# Patient Record
Sex: FEMALE | Race: WHITE | NOT HISPANIC OR LATINO
[De-identification: names, ages, dates, MRNs, and addresses within clinical notes are randomized per-mention and may not be internally consistent; named-entity substitution may affect disease eponyms.]

---

## 2017-01-26 ENCOUNTER — HOSPITAL (OUTPATIENT)
Dept: OTHER | Age: 82
End: 2017-01-26
Attending: INTERNAL MEDICINE

## 2017-01-26 LAB
A/G RATIO_: 0.8
ABS LYMPH: 1 K/CUMM (ref 1–3.5)
ABS MONO: 0.6 K/CUMM (ref 0.1–0.8)
ABS NEUTRO: 7 K/CUMM (ref 2–8)
ALBUMIN: 3.3 G/DL (ref 3.5–5)
ALK PHOS: 97 UNIT/L (ref 50–124)
ALT/GPT: 18 UNIT/L (ref 0–55)
ANION GAP SERPL CALC-SCNC: 15 MEQ/L (ref 10–20)
AST/GOT: 26 UNIT/L (ref 5–34)
BASOPHIL: 1 % (ref 0–1)
BILI TOTAL: 0.6 MG/DL (ref 0.2–1)
BUN SERPL-MCNC: 36 MG/DL (ref 6–20)
CALCIUM: 10 MG/DL (ref 8.4–10.2)
CHLORIDE: 105 MEQ/L (ref 97–107)
CREATININE: 1.88 MG/DL (ref 0.6–1.3)
DIFF_TYPE?: ABNORMAL
EOSINOPHIL: 6 % (ref 0–6)
GLOBULIN_: 4 G/DL (ref 2–4.1)
GLUCOSE LVL: 106 MG/DL (ref 70–99)
HCT VFR BLD CALC: 32 % (ref 33–45)
HEMOLYSIS 2+: NEGATIVE
HGB BLD-MCNC: 10.8 G/DL (ref 11–15)
IMMATURE GRAN: 0.3 % (ref 0–0.3)
INSTR WBC: 9.3 K/CUMM (ref 4–11)
LIPEMIC 3+: NEGATIVE
LYMPHOCYTE: 11 %
MCH RBC QN AUTO: 32 PG (ref 25–35)
MCHC RBC AUTO-ENTMCNC: 33 G/DL (ref 32–37)
MCV RBC AUTO: 96 FL (ref 78–97)
MONOCYTE: 7 %
NEUTROPHIL: 76 %
NRBC BLD MANUAL-RTO: 0 % (ref 0–0.2)
PLATELET: 275 K/CUMM (ref 150–450)
POTASSIUM: 4.6 MEQ/L (ref 3.5–5.1)
RBC # BLD: 3.38 M/CUMM (ref 3.7–5.2)
RDW: 13.2 % (ref 11.5–14.5)
SODIUM: 139 MEQ/L (ref 136–145)
TCO2: 24 MEQ/L (ref 19–29)
TOTAL PROTEIN: 7.3 G/DL (ref 6.4–8.3)
WBC # BLD: 9.3 K/CUMM (ref 4–11)

## 2017-01-27 LAB
ABS LYMPH MAN: 1.4 K/CUMM (ref 1–3.5)
ABS MONO MAN: 0.4 K/CUMM (ref 0.1–0.8)
ABS NEUT MAN: 5 K/CUMM (ref 1.8–7.8)
ANION GAP SERPL CALC-SCNC: 13 MEQ/L (ref 10–20)
BASOPHIL MAN: 1 % (ref 0–1)
BUN SERPL-MCNC: 32 MG/DL (ref 6–20)
CALCIUM: 9.1 MG/DL (ref 8.4–10.2)
CHLORIDE: 105 MEQ/L (ref 97–107)
CREATININE: 1.82 MG/DL (ref 0.6–1.3)
DIFF_TYPE?: ABNORMAL
EOS MAN: 3 % (ref 0–4)
GLUCOSE LVL: 102 MG/DL (ref 70–99)
HCT VFR BLD CALC: 32 % (ref 33–45)
HEMOLYSIS 2+: NEGATIVE
HEMOLYSIS 2+: NEGATIVE
HGB BLD-MCNC: 10.7 G/DL (ref 11–15)
INSTR WBC: 7 K/CUMM (ref 4–11)
LYMPH MAN: 16 %
MAGNESIUM LEVEL: 2 MG/DL (ref 1.6–2.6)
MCH RBC QN AUTO: 32 PG (ref 25–35)
MCHC RBC AUTO-ENTMCNC: 33 G/DL (ref 32–37)
MCV RBC AUTO: 97 FL (ref 78–97)
MONOCYTE MAN: 5 %
NRBC BLD MANUAL-RTO: 0 % (ref 0–0.2)
PLATELET: 242 K/CUMM (ref 150–450)
PLT ESTIMATE: ADEQUATE
POTASSIUM: 5.2 MEQ/L (ref 3.5–5.1)
RBC # BLD: 3.31 M/CUMM (ref 3.7–5.2)
RBC MORPH: ABNORMAL
RDW: 13.2 % (ref 11.5–14.5)
REACT LYMPH MAN: 4 %
SEGS MAN: 71 %
SODIUM: 139 MEQ/L (ref 136–145)
TCO2: 26 MEQ/L (ref 19–29)
TOTAL LYMPHS MANUAL: 20 %
UA APPEAR: CLEAR
UA BILI: NEGATIVE
UA BLOOD: NEGATIVE
UA COLOR: YELLOW
UA GLUCOSE: NEGATIVE
UA KETONES: NEGATIVE
UA LEUK EST: NEGATIVE
UA NITRITE: NEGATIVE
UA PH: 6 (ref 5–7)
UA PROTEIN: NEGATIVE
UA SPEC GRAV: 1.01 (ref 1.01–1.02)
UA UROBILINOGEN: 0.2 MG/DL (ref 0.2–1)
WBC # BLD: 7 K/CUMM (ref 4–11)

## 2017-01-28 LAB
ANION GAP SERPL CALC-SCNC: 14 MEQ/L (ref 10–20)
BUN SERPL-MCNC: 26 MG/DL (ref 6–20)
CALCIUM: 9.3 MG/DL (ref 8.4–10.2)
CHLORIDE: 104 MEQ/L (ref 97–107)
CREATININE: 1.55 MG/DL (ref 0.6–1.3)
GLUCOSE LVL: 100 MG/DL (ref 70–99)
HEMOLYSIS 2+: NEGATIVE
POTASSIUM: 4.5 MEQ/L (ref 3.5–5.1)
SODIUM: 138 MEQ/L (ref 136–145)
TCO2: 25 MEQ/L (ref 19–29)

## 2017-01-29 LAB
ANION GAP SERPL CALC-SCNC: 13 MEQ/L (ref 10–20)
BUN SERPL-MCNC: 34 MG/DL (ref 6–20)
CALCIUM: 9.2 MG/DL (ref 8.4–10.2)
CHLORIDE: 106 MEQ/L (ref 97–107)
CREATININE: 1.63 MG/DL (ref 0.6–1.3)
GLUCOSE LVL: 94 MG/DL (ref 70–99)
HEMOLYSIS 2+: NEGATIVE
POTASSIUM: 4.9 MEQ/L (ref 3.5–5.1)
SODIUM: 139 MEQ/L (ref 136–145)
TCO2: 25 MEQ/L (ref 19–29)

## 2017-01-30 LAB — SURG SPECIMEN: NORMAL

## 2017-02-01 ENCOUNTER — HOSPITAL (OUTPATIENT)
Dept: OTHER | Age: 82
End: 2017-02-01
Attending: INTERNAL MEDICINE

## 2017-02-03 ENCOUNTER — CHARTING TRANS (OUTPATIENT)
Dept: OTHER | Age: 82
End: 2017-02-03

## 2017-02-04 ENCOUNTER — HOSPITAL (OUTPATIENT)
Dept: OTHER | Age: 82
End: 2017-02-04

## 2017-02-04 LAB
ABS LYMPH: 2.4 K/CUMM (ref 1–3.5)
ABS MONO: 0.8 K/CUMM (ref 0.1–0.8)
ABS NEUTRO: 3.2 K/CUMM (ref 2–8)
ANION GAP SERPL CALC-SCNC: 13 MEQ/L (ref 10–20)
BASOPHIL: 1 % (ref 0–1)
BUN SERPL-MCNC: 36 MG/DL (ref 6–20)
CALCIUM: 9.2 MG/DL (ref 8.4–10.2)
CHLORIDE: 106 MEQ/L (ref 97–107)
CREATININE: 1.44 MG/DL (ref 0.6–1.3)
DIFF_TYPE?: ABNORMAL
EOSINOPHIL: 7 % (ref 0–6)
GLUCOSE LVL: 87 MG/DL (ref 70–99)
HCT VFR BLD CALC: 31 % (ref 33–45)
HEMOLYSIS 1+: NEGATIVE
HEMOLYSIS 2+: NEGATIVE
HGB BLD-MCNC: 10.1 G/DL (ref 11–15)
IMMATURE GRAN: 0.6 % (ref 0–0.3)
INSTR WBC: 7.1 K/CUMM (ref 4–11)
LYMPHOCYTE: 34 %
MCH RBC QN AUTO: 32 PG (ref 25–35)
MCHC RBC AUTO-ENTMCNC: 33 G/DL (ref 32–37)
MCV RBC AUTO: 99 FL (ref 78–97)
MONOCYTE: 12 %
NEUTROPHIL: 45 %
NRBC BLD MANUAL-RTO: 0 % (ref 0–0.2)
PLATELET: 313 K/CUMM (ref 150–450)
POTASSIUM: 5.1 MEQ/L (ref 3.5–5.1)
RBC # BLD: 3.13 M/CUMM (ref 3.7–5.2)
RDW: 13.7 % (ref 11.5–14.5)
SODIUM: 141 MEQ/L (ref 136–145)
TCO2: 27 MEQ/L (ref 19–29)
TSH SERPL-ACNC: 9.18 MIU/ML (ref 0.4–5)
VIT D 25 OH LVL: 36.6 NG/ML
WBC # BLD: 7.1 K/CUMM (ref 4–11)

## 2017-02-13 ENCOUNTER — HOSPITAL (OUTPATIENT)
Dept: OTHER | Age: 82
End: 2017-02-13
Attending: ANESTHESIOLOGY

## 2017-02-15 ENCOUNTER — HOSPITAL (OUTPATIENT)
Dept: OTHER | Age: 82
End: 2017-02-15
Attending: INTERNAL MEDICINE

## 2017-02-21 ENCOUNTER — HOSPITAL (OUTPATIENT)
Dept: OTHER | Age: 82
End: 2017-02-21
Attending: ANESTHESIOLOGY

## 2017-03-07 ENCOUNTER — HOSPITAL (OUTPATIENT)
Dept: OTHER | Age: 82
End: 2017-03-07
Attending: ANESTHESIOLOGY

## 2017-04-11 ENCOUNTER — HOSPITAL (OUTPATIENT)
Dept: OTHER | Age: 82
End: 2017-04-11
Attending: ANESTHESIOLOGY

## 2017-04-25 ENCOUNTER — HOSPITAL (OUTPATIENT)
Dept: OTHER | Age: 82
End: 2017-04-25
Attending: ANESTHESIOLOGY

## 2017-05-08 ENCOUNTER — HOSPITAL (OUTPATIENT)
Dept: OTHER | Age: 82
End: 2017-05-08
Attending: ANESTHESIOLOGY

## 2017-06-01 ENCOUNTER — APPOINTMENT (OUTPATIENT)
Dept: RADIATION ONCOLOGY | Facility: HOSPITAL | Age: 82
End: 2017-06-01
Attending: RADIOLOGY
Payer: MEDICARE

## 2017-06-06 ENCOUNTER — HOSPITAL (OUTPATIENT)
Dept: OTHER | Age: 82
End: 2017-06-06
Attending: INTERNAL MEDICINE

## 2017-06-06 LAB
DEVICE SN: NORMAL
POC_GLU: 99 MG/DL (ref 70–99)
TECH_ID: NORMAL

## 2017-07-01 ENCOUNTER — APPOINTMENT (OUTPATIENT)
Dept: RADIATION ONCOLOGY | Facility: HOSPITAL | Age: 82
End: 2017-07-01
Attending: RADIOLOGY
Payer: MEDICARE

## 2017-07-07 ENCOUNTER — OFFICE VISIT (OUTPATIENT)
Dept: RADIATION ONCOLOGY | Facility: HOSPITAL | Age: 82
End: 2017-07-07
Attending: RADIOLOGY
Payer: MEDICARE

## 2017-07-07 VITALS
SYSTOLIC BLOOD PRESSURE: 150 MMHG | BODY MASS INDEX: 25.89 KG/M2 | OXYGEN SATURATION: 99 % | DIASTOLIC BLOOD PRESSURE: 53 MMHG | WEIGHT: 155.38 LBS | RESPIRATION RATE: 20 BRPM | HEART RATE: 61 BPM | HEIGHT: 65 IN | TEMPERATURE: 98 F

## 2017-07-07 PROBLEM — C34.92 PRIMARY CANCER OF LEFT LUNG (HCC): Status: ACTIVE | Noted: 2017-07-07

## 2017-07-07 PROCEDURE — 99212 OFFICE O/P EST SF 10 MIN: CPT

## 2017-07-07 RX ORDER — PANTOPRAZOLE SODIUM 40 MG/1
40 TABLET, DELAYED RELEASE ORAL
COMMUNITY

## 2017-07-07 RX ORDER — FEXOFENADINE HCL 180 MG/1
180 TABLET ORAL DAILY
COMMUNITY

## 2017-07-07 RX ORDER — LOSARTAN POTASSIUM 50 MG/1
50 TABLET ORAL DAILY
COMMUNITY

## 2017-07-07 RX ORDER — FLUTICASONE PROPIONATE AND SALMETEROL 500; 50 UG/1; UG/1
1 POWDER RESPIRATORY (INHALATION) 2 TIMES DAILY
COMMUNITY

## 2017-07-07 RX ORDER — CHLORTHALIDONE 25 MG/1
25 TABLET ORAL DAILY
COMMUNITY

## 2017-07-07 RX ORDER — IPRATROPIUM BROMIDE AND ALBUTEROL SULFATE 2.5; .5 MG/3ML; MG/3ML
3 SOLUTION RESPIRATORY (INHALATION)
COMMUNITY

## 2017-07-07 RX ORDER — AMLODIPINE BESYLATE 5 MG/1
5 TABLET ORAL DAILY
COMMUNITY

## 2017-07-07 RX ORDER — BUPRENORPHINE HCL 8 MG/1
1 TABLET SUBLINGUAL DAILY
COMMUNITY

## 2017-07-07 RX ORDER — POTASSIUM CITRATE 10 MEQ/1
10 TABLET, EXTENDED RELEASE ORAL
COMMUNITY

## 2017-07-07 NOTE — PROGRESS NOTES
RADIATION ONCOLOGY NOTE    DATE OF VISIT: 7/7/2017    DIAGNOSIS :  History of  R lung Stage IA R lung cancer s/p Cyberknife in 7/2013, now with new L lung nodule with radiographic progression. Dear Greer Escudero and colleagues,    As you recall, Ms. Haley Burgess' Rfl:    fluticasone-salmeterol 500-50 MCG/DOSE Inhalation Aerosol Powder, Breath Activated Inhale 1 puff into the lungs 2 (two) times daily.  Disp:  Rfl:    ipratropium-albuterol 0.5-2.5 (3) MG/3ML Inhalation Solution Take 3 mL by nebulization 4 (four) francis nodule with radiographic progression. I have reviewed her outside images in detail with patient and daughter.   I have discussed various management options, including further serial observation, and role of attempted biopsy for definitive proof as the go

## 2017-07-07 NOTE — PROGRESS NOTES
Knowledge Deficit Plan Of Care:    Problem:  Knowledge Deficit    Problems related to:    Stereotactic Radiosurgery    Interventions:  Assess patient knowledge level  Assess knowledge needs  Instruct on treatment planning  Instruct on radiation therapy beto

## 2017-07-07 NOTE — PROGRESS NOTES
Respiratory Plan of Care (if applicable):  Respiratory Problem: Other: O2 dependent, energy conservation  Respiratory Problem Related to: Disease Process  Respiratory Interventions: Assess Respiratory Status, Assess Hydration Status, Monitor Vital Signs, M

## 2017-07-07 NOTE — PROGRESS NOTES
Safety Plan Of Care:    Safety Problem:  sob w/ activity, carries her O2    Related to:    80 yrs old, hx compression fx spine    General Safety Interventions:  80 yrs old, hx compression fx spine    Expected Outcomes:  No injury, trauma or fall  Knowledge

## 2017-07-12 PROCEDURE — 77334 RADIATION TREATMENT AID(S): CPT | Performed by: RADIOLOGY

## 2017-07-12 PROCEDURE — 77470 SPECIAL RADIATION TREATMENT: CPT | Performed by: RADIOLOGY

## 2017-07-12 PROCEDURE — 77290 THER RAD SIMULAJ FIELD CPLX: CPT | Performed by: RADIOLOGY

## 2017-07-13 PROCEDURE — 77370 RADIATION PHYSICS CONSULT: CPT | Performed by: RADIOLOGY

## 2017-07-13 PROCEDURE — 77293 RESPIRATOR MOTION MGMT SIMUL: CPT | Performed by: RADIOLOGY

## 2017-07-13 PROCEDURE — 77295 3-D RADIOTHERAPY PLAN: CPT | Performed by: RADIOLOGY

## 2017-07-18 ENCOUNTER — OFFICE VISIT (OUTPATIENT)
Dept: RADIATION ONCOLOGY | Facility: HOSPITAL | Age: 82
End: 2017-07-18
Attending: RADIOLOGY
Payer: MEDICARE

## 2017-07-18 VITALS
RESPIRATION RATE: 16 BRPM | HEART RATE: 65 BPM | BODY MASS INDEX: 25 KG/M2 | OXYGEN SATURATION: 100 % | TEMPERATURE: 98 F | WEIGHT: 152 LBS | SYSTOLIC BLOOD PRESSURE: 167 MMHG | DIASTOLIC BLOOD PRESSURE: 61 MMHG

## 2017-07-18 PROCEDURE — 77373 STRTCTC BDY RAD THER TX DLVR: CPT | Performed by: RADIOLOGY

## 2017-07-18 PROCEDURE — 77300 RADIATION THERAPY DOSE PLAN: CPT | Performed by: RADIOLOGY

## 2017-07-18 PROCEDURE — 77334 RADIATION TREATMENT AID(S): CPT | Performed by: RADIOLOGY

## 2017-07-18 PROCEDURE — 77280 THER RAD SIMULAJ FIELD SMPL: CPT | Performed by: RADIOLOGY

## 2017-07-18 NOTE — PROGRESS NOTES
[facility] Radiation Treatment Management Note 1-5    Patient:  Rj Casper  Age:  80year old  Visit Diagnosis:  No diagnosis found.   Primary Rad/Onc:  Dr. Cameron No Bruce    Site Delivered Dose (Gy) Prescribed Dose (Gy) Fraction #

## 2017-07-20 PROCEDURE — 77373 STRTCTC BDY RAD THER TX DLVR: CPT | Performed by: RADIOLOGY

## 2017-07-24 PROCEDURE — 77373 STRTCTC BDY RAD THER TX DLVR: CPT | Performed by: RADIOLOGY

## 2017-07-25 PROCEDURE — 77373 STRTCTC BDY RAD THER TX DLVR: CPT | Performed by: RADIOLOGY

## 2017-07-25 NOTE — PROGRESS NOTES
RADIATION ONCOLOGY COMPLETION SUMMARY NOTE    DIAGNOSIS :  History of  R lung Stage IA R lung cancer s/p Cyberknife in 7/2013, now with new L lung nodule with radiographic progression on serial scan, s/p definitive Cyberknife radiosurgery. Dear Greer Urban very much for allowing us to take care of your patient. Please do not hesitate to contact us with any questions.     [email or web address]  [phone]

## 2017-07-26 PROCEDURE — 77336 RADIATION PHYSICS CONSULT: CPT | Performed by: RADIOLOGY

## 2017-10-18 ENCOUNTER — HOSPITAL (OUTPATIENT)
Dept: OTHER | Age: 82
End: 2017-10-18
Attending: RADIOLOGY

## 2017-10-20 ENCOUNTER — OFFICE VISIT (OUTPATIENT)
Dept: RADIATION ONCOLOGY | Facility: HOSPITAL | Age: 82
End: 2017-10-20
Attending: RADIOLOGY
Payer: MEDICARE

## 2017-10-20 VITALS
WEIGHT: 154.19 LBS | SYSTOLIC BLOOD PRESSURE: 162 MMHG | BODY MASS INDEX: 25.69 KG/M2 | HEIGHT: 65 IN | RESPIRATION RATE: 20 BRPM | DIASTOLIC BLOOD PRESSURE: 69 MMHG

## 2017-10-20 PROCEDURE — 99211 OFF/OP EST MAY X REQ PHY/QHP: CPT

## 2017-10-20 NOTE — PROGRESS NOTES
Pt seen in follow up with Dr Gogo Camara, s/p CK lung. Pt and her daughter here to discuss recent CT scan results from 10/18/17. No imaging brought by pt. Pt follows closely with Dr Shen Aguilar, medical oncologist. Pt's qol is excellent per pt. O2 dependent.  Lives in

## 2017-10-21 NOTE — PROGRESS NOTES
RADIATION ONCOLOGY NOTE    DATE OF VISIT: 10/21/2017    DIAGNOSIS :  R lung Stage IA R lung cancer s/p Cyberknife in 7/2013 with tx response, more recently with L lung nodule with radiographic progression on serial scan, s/p definitive Cyberknife radiosurg tablet by mouth daily. Disp:  Rfl:    Lactobacillus (ULTIMATE PROBIOTIC FORMULA OR) Take 1 tablet by mouth daily.  Disp:  Rfl:    fluticasone-salmeterol 500-50 MCG/DOSE Inhalation Aerosol Powder, Breath Activated Inhale 1 puff into the lungs 2 (two) times d with tx response, more recently with L lung nodule with radiographic progression on serial scan, s/p definitive Cyberknife radiosurgery in 7/25/2017, doing well clinically and radiographically, for repeat CT scan in 3 months due to R lung nodule.       Pt d

## 2017-10-23 ENCOUNTER — DOCUMENTATION ONLY (OUTPATIENT)
Dept: RADIATION ONCOLOGY | Facility: HOSPITAL | Age: 82
End: 2017-10-23

## 2018-01-19 ENCOUNTER — HOSPITAL (OUTPATIENT)
Dept: OTHER | Age: 83
End: 2018-01-19
Attending: RADIOLOGY

## 2018-01-23 DIAGNOSIS — C34.90 LUNG CANCER (HCC): Primary | ICD-10-CM

## 2018-01-23 NOTE — PROGRESS NOTES
I spoke to the patient about recent ct scan results. Pt aware next ct scan is due in 3 months April 2018. Pt states she would like to continue to follow up with Dr. Muriel Encarnacion and her pcp. States she has transportation issues.   She will obtain ct orders

## 2018-03-18 ENCOUNTER — HOSPITAL (OUTPATIENT)
Dept: OTHER | Age: 83
End: 2018-03-18
Attending: HOSPITALIST

## 2018-03-18 LAB
A/G RATIO_: 1
ABS LYMPH: 0.2 K/CUMM (ref 1–3.5)
ABS MONO: 0.2 K/CUMM (ref 0.1–0.8)
ABS NEUTRO: 6.3 K/CUMM (ref 2–8)
ALBUMIN: 3.4 G/DL (ref 3.5–5)
ALK PHOS: 78 UNIT/L (ref 50–124)
ALT/GPT: 15 UNIT/L (ref 0–55)
ANION GAP SERPL CALC-SCNC: 12 MEQ/L (ref 10–20)
AST/GOT: 26 UNIT/L (ref 5–34)
BASOPHIL: 0 % (ref 0–1)
BILI TOTAL: 0.4 MG/DL (ref 0.2–1)
BUN SERPL-MCNC: 36 MG/DL (ref 6–20)
CALCIUM: 9.1 MG/DL (ref 8.4–10.2)
CHLORIDE: 106 MEQ/L (ref 97–107)
CREATININE: 1.81 MG/DL (ref 0.6–1.3)
DIFF_TYPE?: ABNORMAL
EOSINOPHIL: 0 % (ref 0–6)
GLOBULIN_: 3.5 G/DL (ref 2–4.1)
GLUCOSE LVL: 105 MG/DL (ref 70–99)
HCT VFR BLD CALC: 33 % (ref 33–45)
HEMOLYSIS 2+: NEGATIVE
HGB BLD-MCNC: 10.8 G/DL (ref 11–15)
ICTERIC 4+: NEGATIVE
IMMATURE GRAN: 0.1 % (ref 0–0.3)
INSTR WBC: 6.8 K/CUMM (ref 4–11)
LIPASE LEVEL: 99 UNIT/L (ref 8–78)
LIPEMIC 3+: NEGATIVE
LYMPHOCYTE: 4 %
MCH RBC QN AUTO: 33 PG (ref 25–35)
MCHC RBC AUTO-ENTMCNC: 33 G/DL (ref 32–37)
MCV RBC AUTO: 100 FL (ref 78–97)
MONOCYTE: 4 %
NEUTROPHIL: 92 %
NRBC BLD MANUAL-RTO: 0.3 % (ref 0–0.2)
PLATELET: 196 K/CUMM (ref 150–450)
PLT ESTIMATE: ADEQUATE
POTASSIUM: 4.1 MEQ/L (ref 3.5–5.1)
RBC # BLD: 3.29 M/CUMM (ref 3.7–5.2)
RBC MORPH: ABNORMAL
RDW: 13.2 % (ref 11.5–14.5)
SODIUM: 138 MEQ/L (ref 136–145)
TCO2: 24 MEQ/L (ref 19–29)
TOTAL PROTEIN: 6.9 G/DL (ref 6.4–8.3)
WBC # BLD: 6.8 K/CUMM (ref 4–11)

## 2018-03-20 LAB
ABS NEUTRO: 3.6 K/CUMM (ref 2–8)
ANION GAP SERPL CALC-SCNC: 11 MEQ/L (ref 10–20)
BUN SERPL-MCNC: 29 MG/DL (ref 6–20)
CALCIUM: 8.1 MG/DL (ref 8.4–10.2)
CHLORIDE: 109 MEQ/L (ref 97–107)
CREATININE: 1.41 MG/DL (ref 0.6–1.3)
GLUCOSE LVL: 82 MG/DL (ref 70–99)
HCT VFR BLD CALC: 31 % (ref 33–45)
HEMOLYSIS 2+: NEGATIVE
HGB BLD-MCNC: 9.9 G/DL (ref 11–15)
INSTR WBC: 6 K/CUMM (ref 4–11)
MCH RBC QN AUTO: 32 PG (ref 25–35)
MCHC RBC AUTO-ENTMCNC: 32 G/DL (ref 32–37)
MCV RBC AUTO: 102 FL (ref 78–97)
NRBC BLD MANUAL-RTO: 0 % (ref 0–0.2)
PLATELET: 178 K/CUMM (ref 150–450)
POTASSIUM: 4.6 MEQ/L (ref 3.5–5.1)
RBC # BLD: 3.08 M/CUMM (ref 3.7–5.2)
RDW: 13.4 % (ref 11.5–14.5)
SODIUM: 139 MEQ/L (ref 136–145)
TCO2: 24 MEQ/L (ref 19–29)
WBC # BLD: 6 K/CUMM (ref 4–11)

## 2018-03-21 ENCOUNTER — HOSPITAL (OUTPATIENT)
Dept: OTHER | Age: 83
End: 2018-03-21
Attending: FAMILY MEDICINE

## 2018-03-23 ENCOUNTER — CHARTING TRANS (OUTPATIENT)
Dept: OTHER | Age: 83
End: 2018-03-23

## 2018-03-26 ENCOUNTER — HOSPITAL (OUTPATIENT)
Dept: OTHER | Age: 83
End: 2018-03-26

## 2018-03-26 LAB
% SATURATION: 15 % (ref 20–55)
A/G RATIO_: 1
ABS LYMPH: 2.3 K/CUMM (ref 1–3.5)
ABS MONO: 0.6 K/CUMM (ref 0.1–0.8)
ABS NEUTRO: 3 K/CUMM (ref 2–8)
ALBUMIN: 2.9 G/DL (ref 3.5–5)
ALK PHOS: 67 UNIT/L (ref 50–124)
ALT/GPT: 15 UNIT/L (ref 0–55)
ANION GAP SERPL CALC-SCNC: 9 MEQ/L (ref 10–20)
AST/GOT: 20 UNIT/L (ref 5–34)
BASOPHIL: 1 % (ref 0–1)
BILI TOTAL: 0.3 MG/DL (ref 0.2–1)
BUN SERPL-MCNC: 25 MG/DL (ref 6–20)
CALC TIBC: 300 UG/DL (ref 260–445)
CALCIUM: 8.8 MG/DL (ref 8.4–10.2)
CHLORIDE: 106 MEQ/L (ref 97–107)
CREATININE: 1.17 MG/DL (ref 0.6–1.3)
DIFF_TYPE?: ABNORMAL
EOSINOPHIL: 2 % (ref 0–6)
FERRITIN: 68.3 NG/ML (ref 10–204)
FOLATE: 16 NG/ML (ref 7–31)
GLOBULIN_: 2.9 G/DL (ref 2–4.1)
GLUCOSE LVL: 87 MG/DL (ref 70–99)
HCT VFR BLD CALC: 29 % (ref 33–45)
HEMOLYSIS 1+: NEGATIVE
HEMOLYSIS 1+: NEGATIVE
HEMOLYSIS 2+: NEGATIVE
HEMOLYSIS 3+: NEGATIVE
HEMOLYSIS 3+: NEGATIVE
HGB BLD-MCNC: 9.5 G/DL (ref 11–15)
ICTERIC 4+: NEGATIVE
IMMATURE GRAN: 0.8 % (ref 0–0.3)
INSTR WBC: 6.1 K/CUMM (ref 4–11)
IRON LVL: 46 UG/DL (ref 35–135)
LIPASE LEVEL: 672 UNIT/L (ref 8–78)
LIPEMIC 3+: NEGATIVE
LIPEMIC 4+: NEGATIVE
LYMPHOCYTE: 38 %
MAGNESIUM LEVEL: 1.9 MG/DL (ref 1.6–2.6)
MCH RBC QN AUTO: 32 PG (ref 25–35)
MCHC RBC AUTO-ENTMCNC: 33 G/DL (ref 32–37)
MCV RBC AUTO: 99 FL (ref 78–97)
MONOCYTE: 10 %
NEUTROPHIL: 49 %
NRBC BLD MANUAL-RTO: 0 % (ref 0–0.2)
PHOSPHORUS: 3.4 MG/DL (ref 2.3–4.7)
PLATELET: 244 K/CUMM (ref 150–450)
POTASSIUM: 4 MEQ/L (ref 3.5–5.1)
RBC # BLD: 2.92 M/CUMM (ref 3.7–5.2)
RDW: 13.2 % (ref 11.5–14.5)
SODIUM: 142 MEQ/L (ref 136–145)
TCO2: 31 MEQ/L (ref 19–29)
TOTAL PROTEIN: 5.8 G/DL (ref 6.4–8.3)
TRANSFERRIN LEVEL: 214 MG/DL (ref 173–360)
VITAMIN B12 LVL: 639 PG/ML (ref 213–816)
WBC # BLD: 6.1 K/CUMM (ref 4–11)

## 2018-03-27 ENCOUNTER — HOSPITAL (OUTPATIENT)
Dept: OTHER | Age: 83
End: 2018-03-27

## 2018-03-28 ENCOUNTER — CHARTING TRANS (OUTPATIENT)
Dept: OTHER | Age: 83
End: 2018-03-28

## 2018-03-29 ENCOUNTER — HOSPITAL (OUTPATIENT)
Dept: OTHER | Age: 83
End: 2018-03-29

## 2018-03-29 LAB
A/G RATIO_: 1.1
ALBUMIN: 3.1 G/DL (ref 3.5–5)
ALK PHOS: 67 UNIT/L (ref 50–124)
ALT/GPT: 22 UNIT/L (ref 0–55)
AMYLASE LEVEL: 295 UNIT/L (ref 25–125)
ANION GAP SERPL CALC-SCNC: 12 MEQ/L (ref 10–20)
AST/GOT: 37 UNIT/L (ref 5–34)
BILI TOTAL: 0.4 MG/DL (ref 0.2–1)
BUN SERPL-MCNC: 27 MG/DL (ref 6–20)
CALCIUM: 9.1 MG/DL (ref 8.4–10.2)
CHLORIDE: 104 MEQ/L (ref 97–107)
CREATININE: 1.25 MG/DL (ref 0.6–1.3)
GLOBULIN_: 2.9 G/DL (ref 2–4.1)
GLUCOSE LVL: 85 MG/DL (ref 70–99)
HEMOLYSIS 2+: NEGATIVE
HEMOLYSIS 3+: NEGATIVE
ICTERIC 3+: NEGATIVE
ICTERIC 4+: NEGATIVE
LIPASE LEVEL: 329 UNIT/L (ref 8–78)
LIPEMIC 3+: NEGATIVE
POTASSIUM: 4 MEQ/L (ref 3.5–5.1)
SODIUM: 142 MEQ/L (ref 136–145)
TCO2: 30 MEQ/L (ref 19–29)
TOTAL PROTEIN: 6 G/DL (ref 6.4–8.3)

## 2018-04-30 ENCOUNTER — HOSPITAL (OUTPATIENT)
Dept: OTHER | Age: 83
End: 2018-04-30
Attending: INTERNAL MEDICINE

## 2018-05-15 ENCOUNTER — HOSPITAL (OUTPATIENT)
Dept: OTHER | Age: 83
End: 2018-05-15

## 2018-11-01 VITALS
HEART RATE: 78 BPM | OXYGEN SATURATION: 98 % | RESPIRATION RATE: 20 BRPM | SYSTOLIC BLOOD PRESSURE: 150 MMHG | TEMPERATURE: 96.6 F | DIASTOLIC BLOOD PRESSURE: 67 MMHG

## 2019-04-16 RX ORDER — FEXOFENADINE HCL 180 MG/1
TABLET ORAL
COMMUNITY

## 2019-04-16 RX ORDER — ACETAMINOPHEN 325 MG/1
TABLET ORAL
COMMUNITY

## 2019-04-16 RX ORDER — FLUTICASONE PROPIONATE AND SALMETEROL 500; 50 UG/1; UG/1
POWDER RESPIRATORY (INHALATION)
COMMUNITY

## 2019-04-16 RX ORDER — LOSARTAN POTASSIUM 50 MG/1
TABLET ORAL
COMMUNITY

## 2019-04-16 RX ORDER — AMLODIPINE BESYLATE 5 MG/1
TABLET ORAL
COMMUNITY

## 2019-04-16 RX ORDER — PANTOPRAZOLE SODIUM 40 MG/1
TABLET, DELAYED RELEASE ORAL
COMMUNITY

## 2019-04-16 RX ORDER — ALBUTEROL SULFATE 0.63 MG/3ML
SOLUTION RESPIRATORY (INHALATION)
COMMUNITY